# Patient Record
Sex: MALE | Race: OTHER | HISPANIC OR LATINO | Employment: UNEMPLOYED | URBAN - METROPOLITAN AREA
[De-identification: names, ages, dates, MRNs, and addresses within clinical notes are randomized per-mention and may not be internally consistent; named-entity substitution may affect disease eponyms.]

---

## 2020-03-07 ENCOUNTER — HOSPITAL ENCOUNTER (OUTPATIENT)
Facility: HOSPITAL | Age: 35
Setting detail: OBSERVATION
Discharge: HOME | End: 2020-03-09
Attending: EMERGENCY MEDICINE | Admitting: INTERNAL MEDICINE

## 2020-03-07 DIAGNOSIS — F10.929 ALCOHOL INTOXICATION (HCC): Primary | ICD-10-CM

## 2020-03-07 LAB — ETHANOL EXG-MCNC: 0.32 MG/DL

## 2020-03-07 PROCEDURE — 99282 EMERGENCY DEPT VISIT SF MDM: CPT | Performed by: EMERGENCY MEDICINE

## 2020-03-07 PROCEDURE — 93005 ELECTROCARDIOGRAM TRACING: CPT

## 2020-03-07 PROCEDURE — 99285 EMERGENCY DEPT VISIT HI MDM: CPT

## 2020-03-07 PROCEDURE — 82075 ASSAY OF BREATH ETHANOL: CPT | Performed by: EMERGENCY MEDICINE

## 2020-03-07 RX ORDER — THIAMINE MONONITRATE (VIT B1) 100 MG
100 TABLET ORAL DAILY
Status: DISCONTINUED | OUTPATIENT
Start: 2020-03-07 | End: 2020-03-09 | Stop reason: HOSPADM

## 2020-03-07 RX ORDER — LANOLIN ALCOHOL/MO/W.PET/CERES
400 CREAM (GRAM) TOPICAL DAILY
Status: DISCONTINUED | OUTPATIENT
Start: 2020-03-07 | End: 2020-03-09 | Stop reason: HOSPADM

## 2020-03-07 RX ORDER — CLOTRIMAZOLE 1 %
CREAM (GRAM) TOPICAL 2 TIMES DAILY
Status: DISCONTINUED | OUTPATIENT
Start: 2020-03-07 | End: 2020-03-09 | Stop reason: HOSPADM

## 2020-03-07 RX ORDER — PERMETHRIN 50 MG/G
CREAM TOPICAL ONCE
Status: COMPLETED | OUTPATIENT
Start: 2020-03-07 | End: 2020-03-08

## 2020-03-07 RX ORDER — OLANZAPINE 10 MG/1
5 INJECTION, POWDER, LYOPHILIZED, FOR SOLUTION INTRAMUSCULAR ONCE
Status: DISCONTINUED | OUTPATIENT
Start: 2020-03-07 | End: 2020-03-09 | Stop reason: HOSPADM

## 2020-03-07 RX ORDER — ACETAMINOPHEN 325 MG/1
650 TABLET ORAL EVERY 6 HOURS PRN
Status: DISCONTINUED | OUTPATIENT
Start: 2020-03-07 | End: 2020-03-09 | Stop reason: HOSPADM

## 2020-03-07 NOTE — ED ATTENDING ATTESTATION
Luiz Alberts MD, saw and evaluated the patient  All available labs and X-rays were ordered by me or the resident and have been reviewed by myself  I discussed the patient with the resident / non-physician and agree with the resident's / non-physician practitioner's findings and plan as documented in the resident's / non-physician practicitioner's note, except where noted  At this point, I agree with the current assessment done in the ED  I was present during key portions of all procedures performed unless otherwise stated  Chief Complaint   Patient presents with    Alcohol Intoxication     public intoxication  4th arrest this week for same  brought in by [facility]  This is a 38 y o  male presenting for evaluation of alcohol intoxication  Per EMS, police, patient, he was on the street, he was brought in with 2 other people for public intoxication  For the rest this week for the same  No acute distress, no complaints  With very weak blow he had an alcohol greater than 308 per EMS  No acute complaints from the patient  PE:  Vitals:    03/07/20 1947 03/07/20 1948 03/07/20 2000 03/07/20 2257   BP: 129/83 129/83 129/83 131/82   Pulse: 99 99 95 96   Resp: 17   17   Temp:  98 3 °F (36 8 °C)  98 1 °F (36 7 °C)   SpO2: 92% 92%  97%   General: VSS, NAD, awake, alert  Well-nourished, well-developed  Appears stated age  Speaking normally in full sentences  Head: Normocephalic, atraumatic, nontender  Eyes: PERRL, EOM-I  No diplopia  No hyphema  No subconjunctival hemorrhages  Symmetrical lids  ENT: Atraumatic external nose and ears  Dry MM  No malocclusion  No stridor  Slurred phonation  No drooling  Normal swallowing  Neck: Symmetric, trachea midline  No JVD  CV: RRR  +S1/S2  No murmurs or gallops  Peripheral pulses +2 throughout  No chest wall tenderness  Lungs:   Unlabored No retractions  CTAB, lungs sounds equal bilateral    No tachypnea     Abd: +BS, soft, NT/ND    MSK: FROM   Back:   No rashes  Skin: Dry, intact  Neuro: drowsy  Intoxicated  Slurring speech  Falls asleep easily  CN II-XII grossly intact otherise  Motor grossly intact  Psychiatric/Behavioral: diffic to rashawn STAHL Exam: deferred  A:  - ETOH  P:  - likely extremely intoxicated, admit for alcohol intoxication  - 13 point ROS was performed and all are normal unless stated in the history above  - Nursing note reviewed  Vitals reviewed  - Orders placed by myself and/or advanced practitioner / resident     - Previous chart was reviewed  - No language barrier    - History obtained from police, ems  - There are limitations to the history obtained  Reasons ROS could not be obtained: ETOH  - Critical care time: Not applicable for this patient  Code Status: Level 1 - Full Code  Advance Directive and Living Will:      Power of :    POLST:      Final Diagnosis:  1   Alcohol intoxication [facility])        ED Course as of Mar 07 2318   Sat Mar 07, 2020   1717 EXTBreath Alcohol: 0 318     Medications   acetaminophen (TYLENOL) tablet 650 mg (has no administration in time range)   OLANZapine (ZyPREXA) IM injection 5 mg (5 mg Intramuscular Not Given 3/7/20 1933)   sterile water injection **ADS Override Pull** (  Not Given 0/4/00 4830)   folic acid (FOLVITE) tablet 400 mcg (400 mcg Oral Not Given 3/7/20 2222)   thiamine (VITAMIN B1) tablet 100 mg (100 mg Oral Not Given 3/7/20 2222)   clotrimazole (LOTRIMIN) 1 % cream (has no administration in time range)     No orders to display     Orders Placed This Encounter   Procedures    Comprehensive metabolic panel    CBC and Platelet    Phosphorus    Magnesium    Diet Regular; Regular House    Vital Signs per unit routine    Up with assistance    Insert peripheral IV    Maintain IV access    Apply SCD or Foot pumps    Follow CIWA-Ar Nursing Protocol    Continuous Pulse Oximetry    Notify physician to discontinue CIWA protocol if CIWA score remains 0-7 for 72 consecutive hours    Insert peripheral IV if not already present    Maintain IV access    Nursing dysphagia assessment    Nursing Communication Do not use for medications  Order will not be received by Pharmacy or dispensed to patient  Pt can take shower   Level 1-Full Code: all life saving measures are indicated    Inpatient consult to Case Management    POCT alcohol breath test    ECG 12 lead    Place in Observation (expected length of stay for this patient is less than two midnights)    Aspiration precautions    Seizure precautions     Labs Reviewed   POCT ALCOHOL BREATH TEST - Normal       Result Value Ref Range Status    EXTBreath Alcohol 0 318   Final     Time reflects when diagnosis was documented in both MDM as applicable and the Disposition within this note     Time User Action Codes Description Comment    3/7/2020  5:34 PM Tasneem Lobe Add [F10 929] Alcohol intoxication [facility])       ED Disposition     ED Disposition Condition Date/Time Comment    Admit Stable Sat Mar 7, 2020  5:34 PM Case was discussed with SOD and the patient's admission status was agreed to be Admission Status: observation status to the service of Dr Reginaldo Turner   Follow-up Information    None       There are no discharge medications for this patient  No discharge procedures on file  None       Portions of the record may have been created with voice recognition software  Occasional wrong word or "sound a like" substitutions may have occurred due to the inherent limitations of voice recognition software  Read the chart carefully and recognize, using context, where substitutions have occurred      Electronically signed by:  Cuauhtemoc Chavez

## 2020-03-07 NOTE — H&P
INTERNAL MEDICINE RESIDENCY ADMISSION H&P     Name: Stephanie Guevara   Age & Sex: 38 y o  male   MRN: [id number]  Unit/Bed#: ED 15   Encounter: [id number]  Primary Care Provider: No primary care provider on file  Code Status: Level 1 - Full Code  Admission Status: OBSERVATION  Disposition: Patient requires Med/Surg    ASSESSMENT/PLAN     Principal Problem:    Alcohol intoxication ([address] )      * Alcohol intoxication ([address] )  Assessment & Plan  · Patient was uncooperative with  phone and is Citizen of [place]-speaking only  Patient did not appear in any acute distress and was asking to leave, but stated patient needs to take a nap  Patient states he would take a small that  Patient is not stable for discharge at this time as patient is still significantly inebriated  · Alcohol breath test 318  · Patient had no complaints on examination  · Continue to monitor for resolution of alcohol intoxication  VTE Pharmacologic Prophylaxis: Reason for no pharmacologic prophylaxis low risk  VTE Mechanical Prophylaxis: sequential compression device    CHIEF COMPLAINT     Chief Complaint   Patient presents with    Alcohol Intoxication     public intoxication  4th arrest this week for same  brought in by [facility]  HISTORY OF PRESENT ILLNESS     78-year-old male with no known past medical history  Patient presents to the emergency department secondary to alcohol intoxication  Most history obtained from chart as patient was still inebriated on examination and was uncooperative with  phone  Patient was brought in per EMS in place as he was found on the street with 2 other people who were publicly intoxicated  Patient has also been arrested for times this week secondary to public intoxication  On initial alcohol test patient was noted to below 308 and while in the emergency department patient was noted to be 318   Patient denied any complaints on examination and stated he was feeling well and wanted to go home  Patient was unable to state how much alcohol he generally drinks and did not answer as to whether he uses any other illicit drugs or tobacco     REVIEW OF SYSTEMS     Review of Systems   Unable to perform ROS: Other     OBJECTIVE     Vitals:    20 1556 20 1630   BP: 128/80 116/62   Pulse: 97 (!) 112   Resp: 16 16   Temp: 97 8 °F (36 6 °C)    SpO2: 92% 100%      Temperature:   Temp (24hrs), Av 8 °F (36 6 °C), Min:97 8 °F (36 6 °C), Max:97 8 °F (36 6 °C)    Temperature: 97 8 °F (36 6 °C)  Intake & Output:  I/O     None        Weights: There is no height or weight on file to calculate BMI  Weight (last 2 days)     None        Physical Exam   Constitutional: He appears well-developed and well-nourished  No distress  HENT:   Head: Normocephalic and atraumatic  Eyes: Conjunctivae are normal  No scleral icterus  Cardiovascular: Normal rate, regular rhythm and normal heart sounds  Exam reveals no gallop and no friction rub  No murmur heard  Pulmonary/Chest: Effort normal and breath sounds normal  No respiratory distress  He has no wheezes  He has no rales  Abdominal: Soft  Bowel sounds are normal  He exhibits no distension  There is no tenderness  Musculoskeletal: Normal range of motion  He exhibits no edema  Neurological: He is alert  No sensory deficit  Skin: Skin is warm  No rash noted  Nursing note and vitals reviewed  PAST MEDICAL HISTORY   No past medical history on file  PAST SURGICAL HISTORY   No past surgical history on file  SOCIAL & FAMILY HISTORY     Social History     Substance and Sexual Activity   Alcohol Use Yes     Substance and Sexual Activity   Alcohol Use Yes        Substance and Sexual Activity   Drug Use Not on file     Social History     Tobacco Use   Smoking Status Not on file     No family history on file  LABORATORY DATA     Labs: I have personally reviewed pertinent reports            Invalid input(s):  Joon input(s): LABALBU                       Micro:  No results found for: BLOODCX, Darol Perking, Lana Bray [address] DIAGNOSTIC TESTS     Imaging: I have personally reviewed pertinent reports  No results found  EKG, Pathology, and Other Studies: I have personally reviewed pertinent reports  ALLERGIES   Not on File  MEDICATIONS PRIOR TO ARRIVAL     Prior to Admission medications    Not on File     MEDICATIONS ADMINISTERED IN LAST 24 HOURS     CURRENT MEDICATIONS            Admission Time  I spent 30 minutes admitting the patient  This involved direct patient contact where I performed a full history and physical, reviewing previous records, and reviewing laboratory and other diagnostic studies  Portions of the record may have been created with voice recognition software  Occasional wrong word or "sound a like" substitutions may have occurred due to the inherent limitations of voice recognition software    Read the chart carefully and recognize, using context, where substitutions have occurred     ==  Robi Hunter, [facility]  Internal Medicine Residency PGY-2

## 2020-03-07 NOTE — ASSESSMENT & PLAN NOTE
· Patient was initially uncooperative with  phone and is [place]-speaking only  Patient did not appear in any acute distress and was asking to leave  · Currently reporting that he does not drink daily, however does appear tremulous  · Alcohol breath test 318 initially  · This morning he is clinically sober  However, will be discharged tomorrow; see above    · Monitor for signs of withdrawal, could potentially be starting as he is somewhat tremulous  · CIWA ordered

## 2020-03-08 PROBLEM — B85.0 HEAD LICE: Status: ACTIVE | Noted: 2020-03-08

## 2020-03-08 PROBLEM — R21 RASH: Status: ACTIVE | Noted: 2020-03-08

## 2020-03-08 LAB
ATRIAL RATE: 108 BPM
P AXIS: 56 DEGREES
PR INTERVAL: 160 MS
QRS AXIS: 46 DEGREES
QRSD INTERVAL: 96 MS
QT INTERVAL: 336 MS
QTC INTERVAL: 450 MS
T WAVE AXIS: -7 DEGREES
VENTRICULAR RATE: 108 BPM

## 2020-03-08 PROCEDURE — 93010 ELECTROCARDIOGRAM REPORT: CPT | Performed by: INTERNAL MEDICINE

## 2020-03-08 RX ORDER — PERMETHRIN 50 MG/G
CREAM TOPICAL ONCE
Status: COMPLETED | OUTPATIENT
Start: 2020-03-08 | End: 2020-03-08

## 2020-03-08 RX ADMIN — Medication 100 MG: at 10:38

## 2020-03-08 RX ADMIN — FOLIC ACID TAB 400 MCG 400 MCG: 400 TAB at 10:35

## 2020-03-08 RX ADMIN — CLOTRIMAZOLE: 10 CREAM TOPICAL at 03:27

## 2020-03-08 RX ADMIN — PERMETHRIN: 50 CREAM TOPICAL at 10:30

## 2020-03-08 RX ADMIN — CLOTRIMAZOLE: 10 CREAM TOPICAL at 17:03

## 2020-03-08 RX ADMIN — CLOTRIMAZOLE 1 APPLICATION: 10 CREAM TOPICAL at 09:00

## 2020-03-08 RX ADMIN — PERMETHRIN: 50 CREAM TOPICAL at 03:27

## 2020-03-08 NOTE — UTILIZATION REVIEW
Initial Clinical Review    Admission: Date/Time/Statement: Admission Orders (From admission, onward)     Ordered        03/07/20 1734  Place in Observation (expected length of stay for this patient is less than two midnights)  Once                   Orders Placed This Encounter   Procedures    Place in Observation (expected length of stay for this patient is less than two midnights)     Standing Status:   Standing     Number of Occurrences:   1     Order Specific Question:   Admitting Physician     Answer:   Anupam Newton     Order Specific Question:   Level of Care     Answer:   Med Surg [16]     ED Arrival Information     Expected Arrival Acuity Means of Arrival Escorted By Service Admission Type    - 3/7/2020 15:47 Urgent Ambulance [place] [address] [address] [facility] Urgent    Arrival Complaint    Alcohol Intoxication        Chief Complaint   Patient presents with    Alcohol Intoxication     public intoxication  4th arrest this week for same  brought in by [facility]  Assessment/Plan: 39 yo male brought to ED by EMS, police for evaluation of alcohol intoxication  Per EMS ETOH level 308 Per pt, EMS pt was on the street intoxicated  Not stable for DC due to significantly intoxicated  Pt refused IV, refused to change into hospital gown, pants, refuses to use  ph  Pt admitted as observation to med surg to monitor for resolution of alcohol intoxication  Day 2 3/8   Pt  Is clinically sober, monitor for signs of withdrawal  CIWA increased to 8  Pt refuses IV  Pt noted to have head lice and rash  Treated presumptively for scabies with permethrin  due to multiple risk factors for scabies  Pt may DC 3/9 after permethrin treatment complete      ED Triage Vitals   Temperature Pulse Respirations Blood Pressure SpO2   03/07/20 1556 03/07/20 1556 03/07/20 1556 03/07/20 1556 03/07/20 1556   97 8 °F (36 6 °C) 97 16 128/80 92 %      Temp Source Heart Rate Source Patient Position - Orthostatic VS BP Location FiO2 (%)   03/08/20 0516 -- -- -- --   Oral          Pain Score       03/07/20 1556       No Pain        Wt Readings from Last 1 Encounters:   No data found for Wt     Additional Vital Signs:     CIWA  2, 0    Date/Time  Temp  Pulse  Resp  BP  MAP (mmHg)  SpO2  O2 Device   03/08/20 05:16:56  98 4 °F (36 9 °C)  104  18  138/81  100  95 %  --   03/07/20 2300  --  --  --  131/82  --  --  --   03/07/20 22:57:27  98 1 °F (36 7 °C)  96  17  131/82  98  97 %         Labs   EXTBreath Alcohol 0 318         EKG 3/7   Sinus tachycardia  Possible Inferior infarct , age undetermined    ED Treatment:   Medication Administration from 03/07/2020 1547 to 03/07/2020 1945       Date/Time Order Dose Route Action Action by Comments        No past medical history on file  Present on Admission:   Alcohol intoxication ([facility] )      Admitting Diagnosis: Alcohol intoxication ([facility] ) [F10 929]  Age/Sex: 38 y o  male  Admission Orders:  Scheduled Medications:    Medications:  clotrimazole  Topical BID   folic acid 062 mcg Oral Daily   OLANZapine 5 mg Intramuscular Once   permethrin  Topical Once   thiamine 100 mg Oral Daily     Continuous IV Infusions:     PRN Meds:    acetaminophen 650 mg Oral Q6H PRN     CBC, Mg, Phos, CMP 3/8  Dysphagia screen  CIWA     IP CONSULT TO CASE MANAGEMENT    Network Utilization Review Department  [email or web address]  ATTENTION: Please call with any questions or concerns to [phone] and carefully listen to the prompts so that you are directed to the right person  All voicemails are confidential   Kristy Mederos all requests for admission clinical reviews, approved or denied determinations and any other requests to dedicated fax number below belonging to the campus where the patient is receiving treatment   List of dedicated fax numbers for the Facilities:  [address] DENIALS (Administrative/Medical Necessity) [phone]   [address] (Maternity/NICU/Pediatrics) [phone]   ST Liya Thompson [phone]   Daniel Grooms [phone]   Kim Garrett [phone]   Mertha Finger [place] Anupam [address] [facility] [phone]   [facility]  [phone]   [address] [facility], [facility]  [address] [place] [address] [phone]

## 2020-03-08 NOTE — PROGRESS NOTES
Unable to complete admission database due to patient being intoxicated  He is refusing to put on hospital pants and socks and take his shoes off  Unable to complete a full assessment due to patient's lack of cooperation at this time  Patient is bed alarmed  Will continue to monitor

## 2020-03-08 NOTE — ED PROVIDER NOTES
History  Chief Complaint   Patient presents with    Alcohol Intoxication     public intoxication  4th arrest this week for same  brought in by [facility] this is a 59-year-old male who presents for evaluation of EtOH intoxication  The patient was arrested by police for being intoxicated in public, along with 2 friends of his  They are all brought to the emergency department for further evaluation  History is limited secondary to patient cooperation and intoxication  He denies any pain at this time  He states he wants to go home  None       No past medical history on file  No past surgical history on file  No family history on file  I have reviewed and agree with the history as documented  E-Cigarette/Vaping     E-Cigarette/Vaping Substances     Social History     Tobacco Use    Smoking status: Not on file   Substance Use Topics    Alcohol use: Yes    Drug use: Not on file        Review of Systems   Unable to perform ROS: Mental status change       Physical Exam  ED Triage Vitals [03/07/20 1556]   Temperature Pulse Respirations Blood Pressure SpO2   97 8 °F (36 6 °C) 97 16 128/80 92 %      Temp src Heart Rate Source Patient Position - Orthostatic VS BP Location FiO2 (%)   -- -- -- -- --      Pain Score       No Pain             Orthostatic Vital Signs  Vitals:    03/07/20 1947 03/07/20 1948 03/07/20 2000 03/07/20 2257   BP: 129/83 129/83 129/83 131/82   Pulse: 99 99 95 96       Physical Exam   Constitutional:   Somnolent  Easily arousable to voice  Intoxicated appearing   HENT:   Head: Normocephalic and atraumatic  Mouth/Throat: Oropharynx is clear and moist  No oropharyngeal exudate  Eyes: Pupils are equal, round, and reactive to light  EOM are normal  No scleral icterus  Neck: Normal range of motion  No JVD present  Cardiovascular: Normal rate, regular rhythm and normal heart sounds  No murmur heard  Pulmonary/Chest: Effort normal  No stridor   No respiratory distress  He has no wheezes  He has no rales  Abdominal: Soft  He exhibits no distension  There is no tenderness  Musculoskeletal: Normal range of motion  He exhibits no edema, tenderness or deformity  Atraumatic   Neurological: He is alert  No cranial nerve deficit  He exhibits normal muscle tone  No focal deficits appreciated   Skin: Skin is warm and dry  No pallor  ED Medications  Medications   acetaminophen (TYLENOL) tablet 650 mg (has no administration in time range)   OLANZapine (ZyPREXA) IM injection 5 mg (5 mg Intramuscular Not Given 3/7/20 1933)   sterile water injection **ADS Override Pull** (  Not Given 7/2/38 9983)   folic acid (FOLVITE) tablet 400 mcg (400 mcg Oral Not Given 3/7/20 2222)   thiamine (VITAMIN B1) tablet 100 mg (100 mg Oral Not Given 3/7/20 2222)   clotrimazole (LOTRIMIN) 1 % cream (has no administration in time range)   permethrin (ELIMITE) 5 % cream (has no administration in time range)       Diagnostic Studies  Results Reviewed     Procedure Component Value Units Date/Time    POCT alcohol breath test [[id number]]  (Normal) Resulted:  03/07/20 1648    Lab Status:  Final result Updated:  03/07/20 1648     EXTBreath Alcohol 0 318                 No orders to display         Procedures  Procedures      ED Course                               MDM  Number of Diagnoses or Management Options  Alcohol intoxication [facility]):   Diagnosis management comments: This is a 41-year-old male who presents for evaluation of alcohol intoxication  On arrival, he is hemodynamically stable, his exam is atraumatic, and though he is intoxicated appearing, is easily arousable to voice, wakes up, and states that he wants to go home  Breath alcohol was 320  For this reason, will admit to the medicine service for alcohol intoxication          Disposition  Final diagnoses:   Alcohol intoxication ([address] )     Time reflects when diagnosis was documented in both MDM as applicable and the Disposition within this note     Time User Action Codes Description Comment    3/7/2020  5:34 PM Reuben Dugan Add [U01 518] Alcohol intoxication [facility])       ED Disposition     ED Disposition Condition Date/Time Comment    Admit Stable Sat Mar 7, 2020  5:34 PM Case was discussed with SOD and the patient's admission status was agreed to be Admission Status: observation status to the service of Dr Shelley Mejia   Follow-up Information    None         There are no discharge medications for this patient  No discharge procedures on file  PDMP Review     None           ED Provider  Attending physically available and evaluated Bianka Mccullough  ESPINOZA managed the patient along with the ED Attending      Electronically Signed by         Zack Mueller MD  03/08/20 7693

## 2020-03-08 NOTE — ASSESSMENT & PLAN NOTE
Patient has rashes over numerous locations on body, including extensor surfaces of elbows that have a nodular appearance, and more plaque appearing rash on abdomen  He reports that the rash is pruritic and has been there for about three months  No lesions appreciated on or between fingers  Appearance is consistent with psoriasis, however given that patient has head lice and is homeless with recent care home time, he is at high risk for scabies  Will treat presumptively for scabies with 1 dose of permethrin    - treatment applied mid morning of 3/8, wash off after 8-14 hours  - may discharge 3/9 after permethrin treatment complete and at 24 hour point past initial application

## 2020-03-08 NOTE — PROGRESS NOTES
INTERNAL MEDICINE RESIDENCY PROGRESS NOTE     Name: Lorenzo Ballard   Age & Sex: 38 y o  male   MRN: [id number]  Unit/Bed#: 2 212-01   Encounter: [id number]  Team: SOD Team B     PATIENT INFORMATION     Name: Lorenzo Ballard   Age & Sex: 38 y o  male   MRN: [id number]  Hospital Stay Days: 0    ASSESSMENT/PLAN     Principal Problem:    Alcohol intoxication ([facility] )  Active Problems:    Rash    Head lice      Head lice  Assessment & Plan  Received permethrin treatment    Rash  Assessment & Plan  Patient has rashes over numerous locations on body, including extensor surfaces of elbows that have a nodular appearance, and more plaque appearing rash on abdomen  He reports that the rash is pruritic and has been there for some time  No lesions appreciated on or between fingers  Appearance is consistent with psoriasis, however given that patient has head lice and is homeless with recent halfway time, he is at high risk for scabies  Will treat presumptively for scabies with 1 dose of permethrin   - may discharge tomorrow after permethrin treatment complete    * Alcohol intoxication ([facility] )  Assessment & Plan  · Patient was initially uncooperative with  phone and is Qatari-speaking only  Patient did not appear in any acute distress and was asking to leave  · Currently reporting that he does not drink daily, however does appear tremulous  · Alcohol breath test 318 initially  · This morning he is clinically sober  However, will be discharged tomorrow; see above  · Monitor for signs of withdrawal, could potentially be starting as he is somewhat tremulous  · CIWA ordered      Disposition:  Continue observation for full treatment of potential scabies    SUBJECTIVE     Patient seen and examined  No acute events overnight  He has no complaints today  He states that he has had his rash for some time, and that he did not realize that he has head lice  He states that the rash sometimes itches    He denies fever, chills, nausea, vomiting, diarrhea, or any other symptoms  OBJECTIVE     Vitals:    20 1015 20 1030 20 1112 20 1115   BP:   160/97 160/97   Pulse: 90 84     Resp:       Temp:       TempSrc:       SpO2: 97% 94%        Temperature:   Temp (24hrs), Av 2 °F (36 8 °C), Min:97 8 °F (36 6 °C), Max:98 4 °F (36 9 °C)    Temperature: 98 4 °F (36 9 °C)  Intake & Output:  I/O       701 -  07 -  -  07    P  O   480     Total Intake  480     Net  +480                Weights: There is no height or weight on file to calculate BMI  Weight (last 2 days)     None        Physical Exam   Constitutional: He is oriented to person, place, and time  He appears well-developed and well-nourished  No distress  HENT:   Head: Normocephalic and atraumatic  Eyes: Conjunctivae and EOM are normal  No scleral icterus  Pulmonary/Chest: Effort normal  No respiratory distress  Abdominal: Soft  Bowel sounds are normal  There is no tenderness  Musculoskeletal: He exhibits no edema or deformity  Neurological: He is alert and oriented to person, place, and time  Mildly tremulous   Skin: Skin is warm and dry  Rash noted  Nodules over extensor elbows  Pruritic, crusted appearing plaque over right side of abdomen  Psychiatric: He has a normal mood and affect  His behavior is normal      LABORATORY DATA     Labs: I have personally reviewed pertinent reports  Invalid input(s):  EOSPCT       Invalid input(s): LABALBU                         IMAGING & DIAGNOSTIC TESTING     Radiology Results: I have personally reviewed pertinent reports  No results found  Other Diagnostic Testing: I have personally reviewed pertinent reports      ACTIVE MEDICATIONS     Current Facility-Administered Medications   Medication Dose Route Frequency    acetaminophen (TYLENOL) tablet 650 mg  650 mg Oral Q6H PRN    clotrimazole (LOTRIMIN) 1 % cream   Topical BID    folic acid (FOLVITE) tablet 400 mcg  400 mcg Oral Daily    OLANZapine (ZyPREXA) IM injection 5 mg  5 mg Intramuscular Once    permethrin (ELIMITE) 5 % cream   Topical Once    thiamine (VITAMIN B1) tablet 100 mg  100 mg Oral Daily       VTE Pharmacologic Prophylaxis: Reason for no pharmacologic prophylaxis low risk  VTE Mechanical Prophylaxis: sequential compression device    Portions of the record may have been created with voice recognition software  Occasional wrong word or "sound a like" substitutions may have occurred due to the inherent limitations of voice recognition software    Read the chart carefully and recognize, using context, where substitutions have occurred   ==  Francisco Javier Reaves, [address] [facility]  Internal Medicine Residency PGY-2

## 2020-03-08 NOTE — PROGRESS NOTES
Pt received 8 on CIWA scale  PT declining PRN medication for W/D  PT would only take multi vitamins  Pt encouraged to drink fluids  Dr Hank Hillman made aware pt going through W/D

## 2020-03-08 NOTE — PLAN OF CARE
Problem: PAIN - ADULT  Goal: Verbalizes/displays adequate comfort level or baseline comfort level  Description  Interventions:  - Encourage patient to monitor pain and request assistance  - Assess pain using appropriate pain scale  - Administer analgesics based on type and severity of pain and evaluate response  - Implement non-pharmacological measures as appropriate and evaluate response  - Consider cultural and social influences on pain and pain management  - Notify physician/advanced practitioner if interventions unsuccessful or patient reports new pain  Outcome: Progressing     Problem: SAFETY ADULT  Goal: Patient will remain free of falls  Description  INTERVENTIONS:  - Assess patient frequently for physical needs  -  Identify cognitive and physical deficits and behaviors that affect risk of falls    -  [place] fall precautions as indicated by assessment   - Educate patient/family on patient safety including physical limitations  - Instruct patient to call for assistance with activity based on assessment  - Modify environment to reduce risk of injury  - Consider OT/PT consult to assist with strengthening/mobility  Outcome: Progressing  Goal: Maintain or return to baseline ADL function  Description  INTERVENTIONS:  -  Assess patient's ability to carry out ADLs; assess patient's baseline for ADL function and identify physical deficits which impact ability to perform ADLs (bathing, care of mouth/teeth, toileting, grooming, dressing, etc )  - Assess/evaluate cause of self-care deficits   - Assess range of motion  - Assess patient's mobility; develop plan if impaired  - Assess patient's need for assistive devices and provide as appropriate  - Encourage maximum independence but intervene and supervise when necessary  - Involve family in performance of ADLs  - Assess for home care needs following discharge   - Consider OT consult to assist with ADL evaluation and planning for discharge  - Provide patient education as appropriate  Outcome: Progressing  Goal: Maintain or return mobility status to optimal level  Description  INTERVENTIONS:  - Assess patient's baseline mobility status (ambulation, transfers, stairs, etc )    - Identify cognitive and physical deficits and behaviors that affect mobility  - Identify mobility aids required to assist with transfers and/or ambulation (gait belt, sit-to-stand, lift, walker, cane, etc )  - [place] fall precautions as indicated by assessment  - Record patient progress and toleration of activity level on Mobility SBAR; progress patient to next Phase/Stage  - Instruct patient to call for assistance with activity based on assessment  - Consider rehabilitation consult to assist with strengthening/weightbearing, etc   Outcome: Progressing     Problem: DISCHARGE PLANNING  Goal: Discharge to home or other facility with appropriate resources  Description  INTERVENTIONS:  - Identify barriers to discharge w/patient and caregiver  - Arrange for needed discharge resources and transportation as appropriate  - Identify discharge learning needs (meds, wound care, etc )  - Arrange for interpretive services to assist at discharge as needed  - Refer to Case Management Department for coordinating discharge planning if the patient needs post-hospital services based on physician/advanced practitioner order or complex needs related to functional status, cognitive ability, or social support system  Outcome: Progressing     Problem: Knowledge Deficit  Goal: Patient/family/caregiver demonstrates understanding of disease process, treatment plan, medications, and discharge instructions  Description  Complete learning assessment and assess knowledge base    Interventions:  - Provide teaching at level of understanding  - Provide teaching via preferred learning methods  Outcome: Progressing

## 2020-03-09 VITALS
OXYGEN SATURATION: 92 % | TEMPERATURE: 99.8 F | HEART RATE: 62 BPM | RESPIRATION RATE: 20 BRPM | DIASTOLIC BLOOD PRESSURE: 100 MMHG | SYSTOLIC BLOOD PRESSURE: 154 MMHG

## 2020-03-09 PROBLEM — F10.929 ALCOHOL INTOXICATION (HCC): Status: RESOLVED | Noted: 2020-03-07 | Resolved: 2020-03-09

## 2020-03-09 LAB
ALBUMIN SERPL BCP-MCNC: 3.5 G/DL (ref 3.5–5)
ALP SERPL-CCNC: 66 U/L (ref 46–116)
ALT SERPL W P-5'-P-CCNC: 74 U/L (ref 12–78)
ANION GAP SERPL CALCULATED.3IONS-SCNC: 6 MMOL/L (ref 4–13)
AST SERPL W P-5'-P-CCNC: 43 U/L (ref 5–45)
BASOPHILS # BLD AUTO: 0.05 THOUSANDS/ΜL (ref 0–0.1)
BASOPHILS NFR BLD AUTO: 1 % (ref 0–1)
BILIRUB SERPL-MCNC: 0.76 MG/DL (ref 0.2–1)
BUN SERPL-MCNC: 11 MG/DL (ref 5–25)
CALCIUM SERPL-MCNC: 8.9 MG/DL (ref 8.3–10.1)
CHLORIDE SERPL-SCNC: 101 MMOL/L (ref 100–108)
CO2 SERPL-SCNC: 28 MMOL/L (ref 21–32)
CREAT SERPL-MCNC: 0.8 MG/DL (ref 0.6–1.3)
EOSINOPHIL # BLD AUTO: 0.14 THOUSAND/ΜL (ref 0–0.61)
EOSINOPHIL NFR BLD AUTO: 2 % (ref 0–6)
ERYTHROCYTE [DISTWIDTH] IN BLOOD BY AUTOMATED COUNT: 12.3 % (ref 11.6–15.1)
GFR SERPL CREATININE-BSD FRML MDRD: 114 ML/MIN/1.73SQ M
GLUCOSE SERPL-MCNC: 93 MG/DL (ref 65–140)
HCT VFR BLD AUTO: 45.2 % (ref 36.5–49.3)
HGB BLD-MCNC: 15.2 G/DL (ref 12–17)
IMM GRANULOCYTES # BLD AUTO: 0.05 THOUSAND/UL (ref 0–0.2)
IMM GRANULOCYTES NFR BLD AUTO: 1 % (ref 0–2)
LYMPHOCYTES # BLD AUTO: 0.99 THOUSANDS/ΜL (ref 0.6–4.47)
LYMPHOCYTES NFR BLD AUTO: 13 % (ref 14–44)
MCH RBC QN AUTO: 30.8 PG (ref 26.8–34.3)
MCHC RBC AUTO-ENTMCNC: 33.6 G/DL (ref 31.4–37.4)
MCV RBC AUTO: 92 FL (ref 82–98)
MONOCYTES # BLD AUTO: 0.83 THOUSAND/ΜL (ref 0.17–1.22)
MONOCYTES NFR BLD AUTO: 10 % (ref 4–12)
NEUTROPHILS # BLD AUTO: 5.89 THOUSANDS/ΜL (ref 1.85–7.62)
NEUTS SEG NFR BLD AUTO: 73 % (ref 43–75)
NRBC BLD AUTO-RTO: 0 /100 WBCS
PLATELET # BLD AUTO: 183 THOUSANDS/UL (ref 149–390)
PMV BLD AUTO: 8.7 FL (ref 8.9–12.7)
POTASSIUM SERPL-SCNC: 3.3 MMOL/L (ref 3.5–5.3)
PROT SERPL-MCNC: 7.6 G/DL (ref 6.4–8.2)
RBC # BLD AUTO: 4.94 MILLION/UL (ref 3.88–5.62)
SODIUM SERPL-SCNC: 135 MMOL/L (ref 136–145)
WBC # BLD AUTO: 7.95 THOUSAND/UL (ref 4.31–10.16)

## 2020-03-09 PROCEDURE — 85025 COMPLETE CBC W/AUTO DIFF WBC: CPT | Performed by: INTERNAL MEDICINE

## 2020-03-09 PROCEDURE — 80053 COMPREHEN METABOLIC PANEL: CPT | Performed by: INTERNAL MEDICINE

## 2020-03-09 RX ORDER — POTASSIUM CHLORIDE 20 MEQ/1
40 TABLET, EXTENDED RELEASE ORAL ONCE
Status: DISCONTINUED | OUTPATIENT
Start: 2020-03-09 | End: 2020-03-09 | Stop reason: HOSPADM

## 2020-03-09 NOTE — SOCIAL WORK
Per chart pt is homeless, resides in [place] with plan to return on discharge  Independent with ADL's/ambulation  No PCP  No emergency contact listed  PT came to [place] via bus and plans to return to [place] via bus        Pt discharged via [address] [place] [address] Drive to [address] [place] [address]

## 2020-03-09 NOTE — DISCHARGE SUMMARY
INTERNAL MEDICINE RESIDENCY DISCHARGE SUMMARY     Dami Kramer   38 y o  male  MRN: [id number]  Room/Bed: [facility] 901/[facility] [address] [place] [address]    Encounter: [id number]    Active Problems:    Rash    Head lice      Head lice  Assessment & Plan  Received permethrin treatment    Rash  Assessment & Plan  Patient has rashes over numerous locations on body, including extensor surfaces of elbows that have a nodular appearance, and more plaque appearing rash on abdomen  He reports that the rash is pruritic and has been there for about three months  No lesions appreciated on or between fingers  Appearance is consistent with psoriasis, however given that patient has head lice and is homeless with recent penitentiary time, he is at high risk for scabies  Will treat presumptively for scabies with 1 dose of permethrin  - treatment applied mid morning of 3/8, wash off after 8-14 hours  - may discharge 3/9 after permethrin treatment complete and at 24 hour point past initial application    * Alcohol intoxication (H[facility])-resolved as of 3/9/2020  Assessment & Plan  · Patient was initially uncooperative with  phone and is Nicaraguan-speaking only  Patient did not appear in any acute distress and was asking to leave  · Currently reporting that he does not drink daily, however does appear tremulous  · Alcohol breath test 318 initially  · This morning he is clinically sober  However, will be discharged tomorrow; see above  · Monitor for signs of withdrawal, could potentially be starting as he is somewhat tremulous  · CIWA ordered      [address]     Per H&P by Dr Steven Scales from 3/7/2020: "75-year-old male with no known past medical history  Patient presents to the emergency department secondary to alcohol intoxication  Most history obtained from chart as patient was still inebriated on examination and was uncooperative with  phone    Patient was brought in per EMS in place as he was found on the street with 2 other people who were publicly intoxicated  Patient has also been arrested for times this week secondary to public intoxication  On initial alcohol test patient was noted to below 308 and while in the emergency department patient was noted to be 318  Patient denied any complaints on examination and stated he was feeling well and wanted to go home  Patient was unable to state how much alcohol he generally drinks and did not answer as to whether he uses any other illicit drugs or tobacco "    During patient's admission, he became clinically sober the next morning  He stated that he does not drink daily but tends to binge drink when he does drink  He states that he usually drinks about once a week  He had no signs of withdrawal with the exception of hypertension, though was not tremulous, agitated, nor diaphoretic  The patient was noted to have multifocal rash over extensor elbows, and multiple locations on abdomen recommend including near umbilicus that is suspicious for scabies  He did not have any notable works in his hands  Given his history of homelessness and incarceration, he was treated presumptively with 1 application of permethrin on the morning of 3/8  He is discharged 24 hours after this application is complete  Subjective: Patient seen and examined on day of discharge  He denies any complaints  He is not tremulous, does not have headache  He states that he does not drink daily  He reports that the rash started about 3 months ago, 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  Denies any complaints      Objective:  Vitals:    03/09/20 0332 03/09/20 0400 03/09/20 0751 03/09/20 1059   BP: (!) 157/107 158/94 162/99 154/100   Pulse: 71  76 62   Resp: 18  20    Temp: 98 4 °F (36 9 °C)  97 8 °F (36 6 °C) 99 8 °F (37 7 °C)   TempSrc:       SpO2: 96%  95% 92%     Physical Exam   Constitutional: He is oriented to person, place, and time  He appears well-developed and well-nourished  No distress  HENT:   Head: Normocephalic and atraumatic  Eyes: Conjunctivae are normal  No scleral icterus  Cardiovascular: Normal rate, regular rhythm and normal heart sounds  No murmur heard  Pulmonary/Chest: Effort normal and breath sounds normal  He has no wheezes  He has no rales  Abdominal: Soft  Bowel sounds are normal  There is no tenderness  Musculoskeletal: He exhibits no edema or deformity  Neurological: He is alert and oriented to person, place, and time  Skin: Skin is warm and dry  Plaque over bilateral flanks, erythematous and pruritic  Some nodularity in these regions, as well as over extensor elbows  Psychiatric: He has a normal mood and affect  His behavior is normal        DISCHARGE INFORMATION     PCP at Discharge: None    Admitting Provider: Trista Fisher MD  Admission Date: 3/7/2020    Discharge Provider: No att  providers found  Discharge Date: 3/9/2020    Discharge Disposition: Home/Self Care  Discharge Condition: stable  Discharge with Lines: no    Discharge Diet: regular diet  Activity Restrictions: none  Test Results Pending at Discharge: none    Discharge Diagnoses:  Principal Problem (Resolved):    Alcohol intoxication ([address] )  Active Problems:    Rash    Head lice      Consulting Providers:      Diagnostic & Therapeutic Procedures Performed:  No results found  Code Status: Level 1 - Full Code  Advance Directive & Living Will: <no information>  Power of :    POLST:      Medications: There are no discharge medications for this patient  There are no discharge medications for this patient  There are no discharge medications for this patient        Allergies:  No Known Allergies    FOLLOW-UP     PCP Outpatient Follow-up:  Pt should establish PCP    Consulting Providers Follow-up:  none required     Active Issues Requiring Follow-up:   Rash    Discharge Statement:   I spent 45 minutes minutes discharging the patient  This time was spent on the day of discharge  I had direct contact with the patient on the day of discharge  Additional documentation is required if more than 30 minutes were spent on discharge  Portions of the record may have been created with voice recognition software  Occasional wrong word or "sound a like" substitutions may have occurred due to the inherent limitations of voice recognition software    Read the chart carefully and recognize, using context, where substitutions have occurred     ==  Citlalli Ambrocio, [facility]  Internal Medicine Resident PGY-2

## 2020-03-13 ENCOUNTER — HOSPITAL ENCOUNTER (EMERGENCY)
Facility: HOSPITAL | Age: 35
Discharge: HOME | End: 2020-03-13
Attending: EMERGENCY MEDICINE | Admitting: EMERGENCY MEDICINE

## 2020-03-13 VITALS
TEMPERATURE: 98.2 F | HEART RATE: 88 BPM | RESPIRATION RATE: 18 BRPM | DIASTOLIC BLOOD PRESSURE: 61 MMHG | SYSTOLIC BLOOD PRESSURE: 133 MMHG | OXYGEN SATURATION: 98 %

## 2020-03-13 DIAGNOSIS — F10.929 ALCOHOL INTOXICATION (HCC): Primary | ICD-10-CM

## 2020-03-13 LAB
ETHANOL EXG-MCNC: 0.26 MG/DL
GLUCOSE SERPL-MCNC: 87 MG/DL (ref 65–140)

## 2020-03-13 PROCEDURE — 99284 EMERGENCY DEPT VISIT MOD MDM: CPT

## 2020-03-13 PROCEDURE — 99282 EMERGENCY DEPT VISIT SF MDM: CPT | Performed by: EMERGENCY MEDICINE

## 2020-03-13 PROCEDURE — 82948 REAGENT STRIP/BLOOD GLUCOSE: CPT

## 2020-03-13 PROCEDURE — 82075 ASSAY OF BREATH ETHANOL: CPT | Performed by: EMERGENCY MEDICINE

## 2020-03-13 NOTE — ED ATTENDING ATTESTATION
3/13/2020  ITrisha MD, saw and evaluated the patient  I have discussed the patient with the resident/non-physician practitioner and agree with the resident's/non-physician practitioner's findings, Plan of Care, and MDM as documented in the resident's/non-physician practitioner's note, except where noted  All available labs and Radiology studies were reviewed  I was present for key portions of any procedure(s) performed by the resident/non-physician practitioner and I was immediately available to provide assistance  At this point I agree with the current assessment done in the Emergency Department  I have conducted an independent evaluation of this patient a history and physical is as follows:  Per police, the patient was somewhere he was not supposed to be, and was picked up and brought to the station  The patient's alcohol was above their threshold to stay in the cell, so he was brought to the emergency department for alcohol intoxication  The patient and the police deny any trauma  The patient denies coma ingestants  He has no complaints at this time  Review of systems otherwise negative in 12 systems reviewed  On exam the patient has lice  Vital signs were reviewed  Patient is awake, alert, interactive  The patient's pupils are equally round reactive to light  Oropharynx is clear with moist mucous membranes  Neck is supple and nontender with no adenopathy or JVD  Heart is regular with no murmurs, rubs, or gallops  Lungs are clear and equal with no wheezes, rales, or rhonchi  Abdomen is soft and nontender with no masses, rebound, or guarding  There is no CVA tenderness  The patient was completely exposed  There is no skin breakdown  There are no rashes or skin changes  Extremities are warm and well perfused with good pulses  The patient has normal strength, sensation, and cranial nerves  Impression:  Alcohol intoxication, lice infestation    Will plan to observe for sobriety and discharge with treatment for lice  ED Course         Critical Care Time  Procedures

## 2020-03-13 NOTE — ED PROVIDER NOTES
History  Chief Complaint   Patient presents with    Alcohol Intoxication     pt came in by ems and with police from the [facility] and had a BAT of  309  pt states he has high blood pressure and high blood sugar       Alcohol Intoxication   Similar prior episodes: yes    Severity:  Moderate  Onset quality:  Gradual  Duration:  1 day  Timing:  Constant  Progression:  Waxing and waning  Chronicity:  Recurrent  Suspected agents:  Alcohol  Associated symptoms: no abdominal pain, no confusion, no hallucinations, no nausea, no shortness of breath, no suicidal ideation, no vomiting and no weakness    Risk factors: no recent illness        None       No past medical history on file  No past surgical history on file  No family history on file  I have reviewed and agree with the history as documented  E-Cigarette/Vaping     E-Cigarette/Vaping Substances     Social History     Tobacco Use    Smoking status: Not on file   Substance Use Topics    Alcohol use: Yes    Drug use: Not on file        Review of Systems   Constitutional: Negative for chills and fever  HENT: Negative for congestion and sore throat  Eyes: Negative for photophobia and visual disturbance  Respiratory: Negative for cough and shortness of breath  Cardiovascular: Negative for chest pain and leg swelling  Gastrointestinal: Negative for abdominal pain, blood in stool, diarrhea, nausea and vomiting  Genitourinary: Negative for dysuria and hematuria  Musculoskeletal: Negative for neck pain and neck stiffness  Skin: Negative for rash and wound  Neurological: Negative for weakness and numbness  Psychiatric/Behavioral: Negative for confusion, hallucinations and suicidal ideas  All other systems reviewed and are negative        Physical Exam  ED Triage Vitals   Temperature Pulse Respirations Blood Pressure SpO2   03/13/20 0100 03/13/20 0100 03/13/20 0500 03/13/20 0100 03/13/20 0100   98 2 °F (36 8 °C) 86 18 137/83 98 %      Temp Source Heart Rate Source Patient Position - Orthostatic VS BP Location FiO2 (%)   03/13/20 0100 03/13/20 0100 03/13/20 0100 03/13/20 0100 --   Oral Monitor Lying Right arm       Pain Score       --                    Orthostatic Vital Signs  Vitals:    03/13/20 0100 03/13/20 0500   BP: 137/83 133/61   Pulse: 86 88   Patient Position - Orthostatic VS: Lying Lying       Physical Exam   Constitutional: He is oriented to person, place, and time  He appears well-developed  No distress  HENT:   Head: Normocephalic  Right Ear: External ear normal    Left Ear: External ear normal    Nose: Nose normal    Mouth/Throat: Oropharynx is clear and moist    Eyes: Conjunctivae and EOM are normal  Right eye exhibits no discharge  Left eye exhibits no discharge  Neck: Neck supple  No tracheal deviation present  Cardiovascular: Normal rate, normal heart sounds and intact distal pulses  Pulmonary/Chest: Effort normal and breath sounds normal  No stridor  No respiratory distress  He has no wheezes  He has no rales  Abdominal: Soft  Bowel sounds are normal  He exhibits no distension  There is no tenderness  There is no rebound and no guarding  Musculoskeletal: He exhibits no tenderness  Neurological: He is alert and oriented to person, place, and time  No cranial nerve deficit or sensory deficit  He exhibits normal muscle tone  Skin: Skin is warm and dry  Capillary refill takes less than 2 seconds  No rash noted  He is not diaphoretic  Psychiatric: His behavior is normal  Thought content is not paranoid and not delusional  He expresses no homicidal and no suicidal ideation  He expresses no suicidal plans and no homicidal plans  Nursing note and vitals reviewed        ED Medications  Medications - No data to display    Diagnostic Studies  Results Reviewed     Procedure Component Value Units Date/Time    POCT alcohol breath test [[id number]]  (Normal) Resulted:  03/13/20 0206    Lab Status:  Final result Updated:  03/13/20 0206 EXTBreath Alcohol 0 258    Fingerstick Glucose (POCT) [[id number]]  (Normal) Collected:  03/13/20 0037    Lab Status:  Final result Updated:  03/13/20 0038     POC Glucose 87 mg/dl                  No orders to display         Procedures  Procedures      ED Course  ED Course as of Mar 13 0703   Fri Mar 13, 2020   0148 POC Glucose: 87   0244 EXTBreath Alcohol: 0 258   0635 Pt clinically sober on reassessment, will dc to home with pcp f/u  Counseled pt not to drive when intoxicated and counseled pt on return precautions to the ED  MDM  Number of Diagnoses or Management Options  Diagnosis management comments: This is a 38-year-old male with a history of alcohol abuse who presents with concern for alcohol intoxication  Per EMS and police, patient was drinking at the [email or web address] tonight was publicly intoxicated, patient was banned by the [email or web address] and so he was taken into custody by police, per police he is being given a citation but is not under arrest   No intervention by EMS on route  Per patient, he states he has a history of hypertension and diabetes, denies using insulin  He states that he has been binge drinking for the past week, he denies other substance use  He denies any SI or HI or auditory visual hallucinations  Patient denies recent illness or other symptoms at this time  On exam he is well-appearing nontoxic, alert and oriented although does appear intoxicated, physical exam is otherwise unremarkable, nonfocal neurologic exam   No evidence of head trauma  Suspect that this is isolated alcohol intoxication  Will plan for sober ride home or have patient sober up in department and discharge          Disposition  Final diagnoses:   Alcohol intoxication ([address] )     Time reflects when diagnosis was documented in both MDM as applicable and the Disposition within this note     Time User Action Codes Description Comment    3/13/2020  6:29 AM Catia Sanz Add [C30 979] Alcohol intoxication [facility])       ED Disposition     ED Disposition Condition Date/Time Comment    Discharge Stable Fri Mar 13, 2020  6:29 AM Cyndie Vivar discharge to home/self care  Follow-up Information     Follow up With Specialties Details Why Contact Info    [employer]  Schedule an appointment as soon as possible for a visit  Call to follow up with a primary care provider [phone]            Patient's Medications    No medications on file     No discharge procedures on file  PDMP Review     None           ED Provider  Attending physically available and evaluated Cyndie Vivar  I managed the patient along with the ED Attending      Electronically Signed by         Mikhail Mejia MD  03/13/20 0670